# Patient Record
Sex: FEMALE | Race: WHITE | NOT HISPANIC OR LATINO | URBAN - METROPOLITAN AREA
[De-identification: names, ages, dates, MRNs, and addresses within clinical notes are randomized per-mention and may not be internally consistent; named-entity substitution may affect disease eponyms.]

---

## 2024-01-05 ENCOUNTER — OFFICE VISIT (OUTPATIENT)
Dept: URBAN - METROPOLITAN AREA CLINIC 70 | Facility: CLINIC | Age: 39
End: 2024-01-05

## 2024-01-17 ENCOUNTER — OFFICE VISIT (OUTPATIENT)
Dept: URBAN - METROPOLITAN AREA CLINIC 70 | Facility: CLINIC | Age: 39
End: 2024-01-17

## 2024-03-06 ENCOUNTER — OV NP (OUTPATIENT)
Dept: URBAN - METROPOLITAN AREA CLINIC 70 | Facility: CLINIC | Age: 39
End: 2024-03-06
Payer: SELF-PAY

## 2024-03-06 ENCOUNTER — LAB (OUTPATIENT)
Dept: URBAN - METROPOLITAN AREA CLINIC 70 | Facility: CLINIC | Age: 39
End: 2024-03-06

## 2024-03-06 VITALS
WEIGHT: 127.3 LBS | HEIGHT: 67 IN | DIASTOLIC BLOOD PRESSURE: 79 MMHG | TEMPERATURE: 98.5 F | BODY MASS INDEX: 19.98 KG/M2 | SYSTOLIC BLOOD PRESSURE: 118 MMHG | HEART RATE: 78 BPM

## 2024-03-06 DIAGNOSIS — K92.1 BLOOD IN STOOL: ICD-10-CM

## 2024-03-06 PROCEDURE — 99204 OFFICE O/P NEW MOD 45 MIN: CPT | Performed by: INTERNAL MEDICINE

## 2024-03-06 RX ORDER — SERTRALINE HYDROCHLORIDE 100 MG/1
1 TABLET TABLET, FILM COATED ORAL ONCE A DAY
Qty: 30 | Status: ACTIVE | COMMUNITY
Start: 2024-03-06

## 2024-03-06 NOTE — HPI-TODAY'S VISIT:
Patient is a 39 y/o female who presents today for evaluation of blood in stool. She reports intermittent BRB mixed in stool after a BM x 1 years. Last episode was last week with no active bleeding at this time. No hematemesis or melena. No prior colonoscopy. No Fhx of colon cancer or IBD. States 1 1/2 years ago she had chronic diarrhea and took a "parasitic cleanse" that resolved her diarrhea. No diarrhea for over 5 months. Has BM daily with formed stool. Denies abdominal pain, wt loss, or constipation. VSS.

## 2024-03-27 ENCOUNTER — LAB (OUTPATIENT)
Dept: URBAN - METROPOLITAN AREA CLINIC 4 | Facility: CLINIC | Age: 39
End: 2024-03-27
Payer: SELF-PAY

## 2024-03-27 ENCOUNTER — COLON (OUTPATIENT)
Dept: URBAN - METROPOLITAN AREA SURGERY CENTER 24 | Facility: SURGERY CENTER | Age: 39
End: 2024-03-27

## 2024-03-27 DIAGNOSIS — C18.9 MALIGNANT NEOPLASM OF COLON, UNSPECIFIED: ICD-10-CM

## 2024-03-27 PROBLEM — 363406005: Status: ACTIVE | Noted: 2024-03-27

## 2024-03-27 PROCEDURE — 88305 TISSUE EXAM BY PATHOLOGIST: CPT | Performed by: PATHOLOGY

## 2024-03-27 RX ORDER — SERTRALINE HYDROCHLORIDE 100 MG/1
1 TABLET TABLET, FILM COATED ORAL ONCE A DAY
Qty: 30 | Status: ACTIVE | COMMUNITY
Start: 2024-03-06